# Patient Record
Sex: MALE | Race: WHITE | URBAN - METROPOLITAN AREA
[De-identification: names, ages, dates, MRNs, and addresses within clinical notes are randomized per-mention and may not be internally consistent; named-entity substitution may affect disease eponyms.]

---

## 2022-05-05 ENCOUNTER — APPOINTMENT (OUTPATIENT)
Dept: URBAN - METROPOLITAN AREA SURGERY 18 | Age: 77
Setting detail: DERMATOLOGY
End: 2022-05-05

## 2022-05-05 VITALS — DIASTOLIC BLOOD PRESSURE: 73 MMHG | SYSTOLIC BLOOD PRESSURE: 115 MMHG | HEART RATE: 57 BPM

## 2022-05-05 PROBLEM — C44.311 BASAL CELL CARCINOMA OF SKIN OF NOSE: Status: ACTIVE | Noted: 2022-05-05

## 2022-05-05 PROCEDURE — OTHER REFERRAL CORRESPONDENCE: OTHER

## 2022-05-05 PROCEDURE — OTHER COUNSELING: OTHER

## 2022-05-05 PROCEDURE — 17311 MOHS 1 STAGE H/N/HF/G: CPT

## 2022-05-05 PROCEDURE — OTHER MOHS SURGERY: OTHER

## 2022-05-05 PROCEDURE — 14060 TIS TRNFR E/N/E/L 10 SQ CM/<: CPT

## 2022-05-12 ENCOUNTER — APPOINTMENT (OUTPATIENT)
Dept: URBAN - METROPOLITAN AREA SURGERY 18 | Age: 77
Setting detail: DERMATOLOGY
End: 2022-05-13

## 2022-05-12 DIAGNOSIS — Z48.817 ENCOUNTER FOR SURGICAL AFTERCARE FOLLOWING SURGERY ON THE SKIN AND SUBCUTANEOUS TISSUE: ICD-10-CM

## 2022-05-12 PROCEDURE — 99024 POSTOP FOLLOW-UP VISIT: CPT

## 2022-05-12 PROCEDURE — OTHER POST-OP WOUND CHECK: OTHER

## 2022-05-12 ASSESSMENT — LOCATION SIMPLE DESCRIPTION DERM: LOCATION SIMPLE: RIGHT NOSE

## 2022-05-12 ASSESSMENT — LOCATION ZONE DERM: LOCATION ZONE: NOSE

## 2022-05-12 ASSESSMENT — LOCATION DETAILED DESCRIPTION DERM: LOCATION DETAILED: RIGHT NASAL SIDEWALL

## 2022-05-12 NOTE — PROCEDURE: POST-OP WOUND CHECK
Additional Comments: Patient presents for a 1 week post Mohs wound check. Surgical site is healing well and is well approximated and contoured. Patient reports having had to change the bandage a few times since last week because the telfa pad wasn’t staying in place. Wound was cleaned with hibiclens and rinsed with sterile saline. Applied Vaseline, telfa, and hypafix tape. Advised patient to do his best to leave the bandage in place clean and dry for another week and if unable, he could change it every few days as needed. Advised that he needs to keep it bandaged until fully healed. Patient to follow-up in 3-4 weeks.
Detail Level: Detailed
Add 95239 Cpt? (Important Note: In 2017 The Use Of 47398 Is Being Tracked By Cms To Determine Future Global Period Reimbursement For Global Periods): yes
Wound Evaluated By: Dr. Isaias Macedo

## 2022-06-13 ENCOUNTER — APPOINTMENT (OUTPATIENT)
Dept: URBAN - METROPOLITAN AREA SURGERY 18 | Age: 77
Setting detail: DERMATOLOGY
End: 2022-06-18

## 2022-06-13 DIAGNOSIS — Z48.817 ENCOUNTER FOR SURGICAL AFTERCARE FOLLOWING SURGERY ON THE SKIN AND SUBCUTANEOUS TISSUE: ICD-10-CM

## 2022-06-13 PROCEDURE — 99024 POSTOP FOLLOW-UP VISIT: CPT

## 2022-06-13 PROCEDURE — OTHER POST-OP WOUND CHECK: OTHER

## 2022-06-13 ASSESSMENT — LOCATION SIMPLE DESCRIPTION DERM: LOCATION SIMPLE: RIGHT NOSE

## 2022-06-13 ASSESSMENT — LOCATION DETAILED DESCRIPTION DERM: LOCATION DETAILED: RIGHT NASAL SIDEWALL

## 2022-06-13 ASSESSMENT — LOCATION ZONE DERM: LOCATION ZONE: NOSE

## 2022-06-13 NOTE — PROCEDURE: POST-OP WOUND CHECK
Wound Evaluated By: Dr. Isaias Macedo
Detail Level: Detailed
Add 01955 Cpt? (Important Note: In 2017 The Use Of 85282 Is Being Tracked By Cms To Determine Future Global Period Reimbursement For Global Periods): yes
Additional Comments: Patient presents for 6 weeks wound check . Flap site well contoured, well healed. Patient to begin gentle massage to the area daily. Discussed interventions to improve/blend/revise scar line with patient in detail. Patient said he would call in couple months if he wished to proceed. Patient educated on importance of sunscreen as well as sun protection to this area. Follow up as needed.
